# Patient Record
Sex: FEMALE | HISPANIC OR LATINO
[De-identification: names, ages, dates, MRNs, and addresses within clinical notes are randomized per-mention and may not be internally consistent; named-entity substitution may affect disease eponyms.]

---

## 2021-12-08 PROBLEM — Z00.00 ENCOUNTER FOR PREVENTIVE HEALTH EXAMINATION: Status: ACTIVE | Noted: 2021-12-08

## 2022-02-17 ENCOUNTER — APPOINTMENT (OUTPATIENT)
Dept: NEPHROLOGY | Facility: CLINIC | Age: 77
End: 2022-02-17

## 2022-10-20 ENCOUNTER — APPOINTMENT (OUTPATIENT)
Dept: NEPHROLOGY | Facility: CLINIC | Age: 77
End: 2022-10-20

## 2022-10-20 VITALS
OXYGEN SATURATION: 97 % | BODY MASS INDEX: 26.98 KG/M2 | WEIGHT: 158 LBS | TEMPERATURE: 97.7 F | DIASTOLIC BLOOD PRESSURE: 76 MMHG | HEIGHT: 64 IN | HEART RATE: 87 BPM | SYSTOLIC BLOOD PRESSURE: 136 MMHG

## 2022-10-20 PROCEDURE — 99204 OFFICE O/P NEW MOD 45 MIN: CPT

## 2022-10-27 NOTE — HISTORY OF PRESENT ILLNESS
[FreeTextEntry1] : HPI: Patient is a 77 year old female with HTN, HLD, DM here for initial visit for discussion of labs and evaluation of renal function. \par \par DM > 25 yrs+\par \par Denies dysuria, gross hematuria, SOB, CP, cough, expectoration, fever, chills, palpitation, syncope, urgency, hesitancy, swelling on feet, joint pain. No history of chronic NSAID use, nephrolithiasis or renal diseases in the family. \par \par REVIEW OF SYSTEM:  All systems were reviewed in detail.  Pertinent positive and negatives have been mentioned in history of present illness.  The rest are negative.\par \par \par PMH: HTN, HLD, DM, Anemia\par PSH: none\par Family History: DM and CAD father\par SH: no tobacco, alcohol, drugs \par

## 2022-10-27 NOTE — ASSESSMENT
[FreeTextEntry1] : Chronic Kidney Disease Stage III\par \par ETIO\par likely related to diabetic nephropathy\par \par DATA\par SCr:  1.3 mg/dl range for an eGFR 50\par Proteinuria: mild albuminuria, \par Renal US: not available\par \par GOALS\par Discussed goal HTN < 140/90, LDL <100, A1C<7.\par For HTN: controlled on current medications\par For hyperlipidemia: controlled on current medications, per patient\par For DM: not that well controlled on current medications, per patient\par \par COMPLICATIONS OF CKD:\par BMD w/ CKD: PTH within range for his level of CKD, Vit D WNL.\par Anemia w/ CKD: iron panel, folate, vit B12 wnl\par Edema: none\par \par RECOMMENDATIONS: \par 1. To continue on current dose of ACEI \par 2. Will initiate a referral to endocrinology for MD management\par 3. Will order cologuard for anemia\par 4. Uric acid and other BMD labs on next visit.\par \par

## 2022-11-28 ENCOUNTER — APPOINTMENT (OUTPATIENT)
Dept: ENDOCRINOLOGY | Facility: CLINIC | Age: 77
End: 2022-11-28

## 2022-11-28 VITALS
HEART RATE: 89 BPM | DIASTOLIC BLOOD PRESSURE: 80 MMHG | BODY MASS INDEX: 25.78 KG/M2 | WEIGHT: 151 LBS | HEIGHT: 64 IN | SYSTOLIC BLOOD PRESSURE: 130 MMHG

## 2022-11-28 DIAGNOSIS — D64.9 ANEMIA, UNSPECIFIED: ICD-10-CM

## 2022-11-28 DIAGNOSIS — E78.5 HYPERLIPIDEMIA, UNSPECIFIED: ICD-10-CM

## 2022-11-28 DIAGNOSIS — E04.9 NONTOXIC GOITER, UNSPECIFIED: ICD-10-CM

## 2022-11-28 DIAGNOSIS — E11.9 TYPE 2 DIABETES MELLITUS W/OUT COMPLICATIONS: ICD-10-CM

## 2022-11-28 DIAGNOSIS — N18.9 CHRONIC KIDNEY DISEASE, UNSPECIFIED: ICD-10-CM

## 2022-11-28 DIAGNOSIS — E11.42 TYPE 2 DIABETES MELLITUS WITH DIABETIC POLYNEUROPATHY: ICD-10-CM

## 2022-11-28 LAB — GLUCOSE BLDC GLUCOMTR-MCNC: 379

## 2022-11-28 PROCEDURE — 99205 OFFICE O/P NEW HI 60 MIN: CPT | Mod: 25

## 2022-11-28 PROCEDURE — 82962 GLUCOSE BLOOD TEST: CPT

## 2022-11-28 RX ORDER — ROSUVASTATIN CALCIUM 20 MG/1
20 TABLET, FILM COATED ORAL
Qty: 90 | Refills: 0 | Status: ACTIVE | COMMUNITY
Start: 2022-03-07

## 2022-11-28 RX ORDER — ADHESIVE TAPE 3"X 2.3 YD
50 MCG TAPE, NON-MEDICATED TOPICAL
Qty: 30 | Refills: 0 | Status: ACTIVE | COMMUNITY
Start: 2022-03-04

## 2022-11-28 RX ORDER — ALENDRONATE SODIUM 35 MG/1
35 TABLET ORAL
Qty: 12 | Refills: 0 | Status: ACTIVE | COMMUNITY
Start: 2022-10-25

## 2022-11-28 RX ORDER — MULTIVITAMIN WITH FOLIC ACID 400 MCG
TABLET ORAL
Qty: 30 | Refills: 0 | Status: ACTIVE | COMMUNITY
Start: 2022-10-25

## 2022-11-28 RX ORDER — LOSARTAN POTASSIUM 100 MG/1
100 TABLET, FILM COATED ORAL
Qty: 90 | Refills: 0 | Status: ACTIVE | COMMUNITY
Start: 2022-09-16

## 2022-11-28 RX ORDER — CLOPIDOGREL BISULFATE 75 MG/1
75 TABLET, FILM COATED ORAL
Qty: 90 | Refills: 0 | Status: ACTIVE | COMMUNITY
Start: 2022-10-25

## 2022-11-28 RX ORDER — GABAPENTIN 300 MG/1
300 CAPSULE ORAL
Qty: 270 | Refills: 0 | Status: ACTIVE | COMMUNITY
Start: 2022-10-25

## 2022-11-28 RX ORDER — MELOXICAM 15 MG/1
15 TABLET ORAL
Qty: 90 | Refills: 0 | Status: ACTIVE | COMMUNITY
Start: 2022-03-07

## 2022-11-28 NOTE — HISTORY OF PRESENT ILLNESS
[FreeTextEntry1] : 77 y.o. Female with PMHx of DM, Neuropathy, CKD, Anemia, HLD, Hx of mini CVA referred from [facility] for evaluation of Hyperglycemia. Dx with DM 26 y.ago. On insulin for the last 8 years. Metformin stopped 3 months ago due to low GFR. A1C has been ranging 8.2 to 8.5%. Does not check SMBG. Not physically active. Does not keep any particular diet. \par \par Currently on:\par Toujeo 30U qhs\par

## 2022-11-28 NOTE — PHYSICAL EXAM
[Alert] : alert [Well Nourished] : well nourished [No Acute Distress] : no acute distress [Well Developed] : well developed [Normal Sclera/Conjunctiva] : normal sclera/conjunctiva [EOMI] : extra ocular movement intact [PERRL] : pupils equal, round and reactive to light [Normal Outer Ear/Nose] : the ears and nose were normal in appearance [Normal Hearing] : hearing was normal [No Respiratory Distress] : no respiratory distress [Clear to Auscultation] : lungs were clear to auscultation bilaterally [Normal Rate] : heart rate was normal [Regular Rhythm] : with a regular rhythm [No Edema] : no peripheral edema [Normal Bowel Sounds] : normal bowel sounds [Not Tender] : non-tender [Soft] : abdomen soft [Normal Supraclavicular Nodes] : no supraclavicular lymphadenopathy [Normal Anterior Cervical Nodes] : no anterior cervical lymphadenopathy [Normal Posterior Cervical Nodes] : no posterior cervical lymphadenopathy [Normal Gait] : normal gait [No Clubbing, Cyanosis] : no clubbing  or cyanosis of the fingernails [No Joint Swelling] : no joint swelling seen [No Rash] : no rash [Normal Reflexes] : deep tendon reflexes were 2+ and symmetric [No Tremors] : no tremors [Oriented x3] : oriented to person, place, and time [Normal Affect] : the affect was normal [Normal Mood] : the mood was normal [Acanthosis Nigricans] : no acanthosis nigricans [de-identified] : Mildly enlarged thyroid gland, appears prominents

## 2022-11-28 NOTE — ASSESSMENT
[Carbohydrate Consistent Diet] : carbohydrate consistent diet [Importance of Diet and Exercise] : importance of diet and exercise to improve glycemic control, achieve weight loss and improve cardiovascular health [Exercise/Effect on Glucose] : exercise/effect on glucose [FreeTextEntry1] : 77 y.o. Female from [place] with PMHx of DM, Neuropathy, CKD, GENESIS, HLD, Hx of mini CVA referred from [facility], [place] for evaluation of Hyperglycemia. Dx with DM 26 y.ago. On insulin for the last 8 years. Metformin stopped 3 months ago due to low GFR. A1C has been ranging 8.2 to 8.5%. Does not check SMBG. Not physically active. Does not keep any particular diet. \par \par Currently on:\par Toujeo 30U qhs\par \par Patient presents with her son who is taking care of her appointments and helps with translation. \par \par POC glucose today 379\par \par # Poorly controlled DM\par A1C might be higher in view of chr. anemia and CKD\par ?compliance and dietary indiscretions\par Increase Toujeo to 35U qhs\par Will apply LibrePro. LOT [id number], SN [id number], Exp 02/28/2023\par Patient will be called for adjustments. \par Will refer to CDE for dietary input and proper insulin administration.\par Discussed dietary and lifestyle modifications with patient and her son. \par \par # Diabetic Neuropathy\par Continue  Gabapentin\par \par # HLD \par Continue Rosuvastatin 20 mg daily\par Advised low fat/cholesterol diet\par Follow lipid panel. \par \par # HTN and CKD\par Continue Losartan\par \par # GENESIS\par On IV iron infusions q2 weeks\par Follows with [facility], [place]\par Continue current management\par \par Will call the patient in 2 weeks for further recommendations on the DM management\par F/u in 3 months with repeat blood work. \par \par

## 2022-12-28 RX ORDER — INSULIN GLARGINE 300 U/ML
300 INJECTION, SOLUTION SUBCUTANEOUS TWICE DAILY
Qty: 3 | Refills: 0 | Status: ACTIVE | COMMUNITY
Start: 2022-10-25 | End: 1900-01-01

## 2022-12-28 RX ORDER — INSULIN LISPRO 100 U/ML
100 INJECTION, SOLUTION SUBCUTANEOUS
Qty: 2 | Refills: 0 | Status: ACTIVE | COMMUNITY
Start: 2022-12-28 | End: 1900-01-01

## 2022-12-28 RX ORDER — BLOOD-GLUCOSE METER
W/DEVICE EACH MISCELLANEOUS
Qty: 1 | Refills: 0 | Status: ACTIVE | COMMUNITY
Start: 2022-12-28 | End: 1900-01-01

## 2022-12-28 RX ORDER — BLOOD SUGAR DIAGNOSTIC
STRIP MISCELLANEOUS
Qty: 2 | Refills: 1 | Status: ACTIVE | COMMUNITY
Start: 2022-12-28 | End: 1900-01-01

## 2022-12-28 RX ORDER — PEN NEEDLE, DIABETIC 32GX 5/32"
32G X 4 MM NEEDLE, DISPOSABLE MISCELLANEOUS
Qty: 4 | Refills: 1 | Status: ACTIVE | COMMUNITY
Start: 2022-10-25 | End: 1900-01-01

## 2022-12-28 RX ORDER — LANCETS 30 GAUGE
EACH MISCELLANEOUS
Qty: 3 | Refills: 0 | Status: ACTIVE | COMMUNITY
Start: 2022-12-28 | End: 1900-01-01

## 2023-01-04 ENCOUNTER — APPOINTMENT (OUTPATIENT)
Dept: ENDOCRINOLOGY | Facility: CLINIC | Age: 78
End: 2023-01-04

## 2023-01-30 ENCOUNTER — APPOINTMENT (OUTPATIENT)
Dept: ENDOCRINOLOGY | Facility: CLINIC | Age: 78
End: 2023-01-30

## 2023-02-14 ENCOUNTER — OFFICE (OUTPATIENT)
Dept: URBAN - METROPOLITAN AREA CLINIC 94 | Facility: CLINIC | Age: 78
Setting detail: OPHTHALMOLOGY
End: 2023-02-14
Payer: MEDICARE

## 2023-02-14 DIAGNOSIS — H43.813: ICD-10-CM

## 2023-02-14 DIAGNOSIS — E11.3312: ICD-10-CM

## 2023-02-14 DIAGNOSIS — E11.3291: ICD-10-CM

## 2023-02-14 PROCEDURE — 67028 INJECTION EYE DRUG: CPT | Performed by: OPHTHALMOLOGY

## 2023-02-14 PROCEDURE — 92134 CPTRZ OPH DX IMG PST SGM RTA: CPT | Performed by: OPHTHALMOLOGY

## 2023-02-14 ASSESSMENT — REFRACTION_AUTOREFRACTION
OS_SPHERE: +0.25
OS_AXIS: 086
OD_CYLINDER: -1.50
OD_SPHERE: +0.25
OD_AXIS: 089
OS_CYLINDER: -1.50

## 2023-02-14 ASSESSMENT — KERATOMETRY
OS_AXISANGLE_DEGREES: 166
OD_K1POWER_DIOPTERS: 44.25
OS_K1POWER_DIOPTERS: 44.75
OD_AXISANGLE_DEGREES: 179
OD_K2POWER_DIOPTERS: 44.75
METHOD_AUTO_MANUAL: AUTO
OS_K2POWER_DIOPTERS: 45.00

## 2023-02-14 ASSESSMENT — PACHYMETRY
OS_CT_UM: 550
OS_CT_CORRECTION: -1

## 2023-02-14 ASSESSMENT — VISUAL ACUITY
OD_BCVA: 20/30+
OS_BCVA: 20/30-

## 2023-02-14 ASSESSMENT — SPHEQUIV_DERIVED
OS_SPHEQUIV: -0.5
OD_SPHEQUIV: -0.5

## 2023-02-14 ASSESSMENT — AXIALLENGTH_DERIVED
OD_AL: 23.4207
OS_AL: 23.2857

## 2023-02-14 ASSESSMENT — CONFRONTATIONAL VISUAL FIELD TEST (CVF)
OS_FINDINGS: FULL
OD_FINDINGS: FULL

## 2023-02-14 ASSESSMENT — TONOMETRY
OS_IOP_MMHG: 14
OD_IOP_MMHG: 14

## 2023-04-20 ENCOUNTER — APPOINTMENT (OUTPATIENT)
Dept: NEPHROLOGY | Facility: CLINIC | Age: 78
End: 2023-04-20